# Patient Record
Sex: FEMALE | Race: WHITE | NOT HISPANIC OR LATINO | Employment: FULL TIME | URBAN - METROPOLITAN AREA
[De-identification: names, ages, dates, MRNs, and addresses within clinical notes are randomized per-mention and may not be internally consistent; named-entity substitution may affect disease eponyms.]

---

## 2023-09-25 ENCOUNTER — HOSPITAL ENCOUNTER (OUTPATIENT)
Dept: DATA CONVERSION | Facility: HOSPITAL | Age: 58
End: 2023-09-25
Attending: ORTHOPAEDIC SURGERY | Admitting: ORTHOPAEDIC SURGERY

## 2023-09-25 DIAGNOSIS — M25.762 OSTEOPHYTE, LEFT KNEE: ICD-10-CM

## 2023-09-25 DIAGNOSIS — M21.162 VARUS DEFORMITY, NOT ELSEWHERE CLASSIFIED, LEFT KNEE: ICD-10-CM

## 2023-09-25 DIAGNOSIS — M17.12 UNILATERAL PRIMARY OSTEOARTHRITIS, LEFT KNEE: ICD-10-CM

## 2023-09-30 NOTE — H&P
History & Physical Reviewed:   Pregnant/Lactating:  ·  Are You Pregnant no   ·  Are You Currently Breastfeeding no     I have reviewed the History and Physical dated:  25-Sep-2023   History and Physical reviewed and relevant findings noted. Patient examined to review pertinent physical  findings.: No significant changes   Home Medications Reviewed: no changes noted   Allergies Reviewed: no changes noted       ERAS (Enhanced Recovery After Surgery):  ·  ERAS Patient: yes   ·  CPM/PAT Utilization: yes   ·  Immunonutrition Recovery Drink Utilization: yes   ·  Carbohydrate Supplement Drink Utilization: yes     Consent:   COVID-19 Consent:  ·  COVID-19 Risk Consent Surgeon has reviewed key risks related to the risk of june COVID-19 and if they contract COVID-19 what the risks are.       Electronic Signatures:  Chris Franklin)  (Signed 25-Sep-2023 07:35)   Authored: History & Physical Reviewed, ERAS, Consent,  Note Completion      Last Updated: 25-Sep-2023 07:35 by Chris Franklin)

## 2023-10-01 NOTE — OP NOTE
Post Operative Note:     PreOp Diagnosis: osteoarthritis left knee   Post-Procedure Diagnosis: osteoarthritis left knee   Procedure: Left total knee replacement   Surgeon: Chris Franklin D.O.   Resident/Fellow/Other Assistant: Colin Nuñez S.A.   Anesthesia: spinal , adductor canal block   Estimated Blood Loss (mL): 50 ml   Specimen: no   Findings: osteoarthritis left knee       Electronic Signatures:  Chris Franklin)  (Signed 25-Sep-2023 10:21)   Authored: Post Operative Note, Note Completion      Last Updated: 25-Sep-2023 10:21 by Chris Franklin)

## 2024-05-06 NOTE — OP NOTE
PREOPERATIVE DIAGNOSIS:  Osteoarthritis, left knee with varus deformity.    POSTOPERATIVE DIAGNOSIS:  Osteoarthritis, left knee with varus deformity.    OPERATION/PROCEDURE:  Right total knee replacement (DePuy, attune, rotating platform, MIS  approach).     SURGEON:  Chris Franklin DO.    ASSISTANT(S):  Colin Nuñez SA    ANESTHESIA:  Spinal with adductor canal block.    PATHOLOGY:  This 57-year-old female complained of pain and deformity in the left  knee ongoing for a number of years.  She had failed conservative  measures, had positive provocative test and radiographs revealed  advanced arthritis in the left knee.  She elected to proceed with  total knee replacement.  At the time of surgery, severe  osteoarthritis was noted in the left knee with bone-on-bone changes  present in the medial and patellofemoral joints and large osteophytes  also noted.     DESCRIPTION OF PROCEDURE:  The patient brought to operative suite.  Satisfactory spinal and  adductor canal block anesthetic was administered by the department of  anesthesia.  The left lower extremity was sterilely draped and  prepped free in routine surgical fashion.  A tourniquet was applied  in the upper 1/3rd of the patient's left thigh, and the left lower  extremity was exsanguinated free of blood.  Tourniquet inflated to  325 mmHg.  Landmarks were identified.  Incision was performed over  the anteromedial aspect of the patient's left knee extending  approximately 12 cm in length.  Dissection carried down to the  extensor mechanism, where a trivector quad sparing incision was  performed.  Patella was everted 90 degrees, fat pad was excised.  Patella was measured and found to be 24 mm thick.  The arthritic  articular cartilage was resected from the patella using a sagittal  saw.  Drill holes were placed in the cut surface of the patella, and  a trial size 32 patella was placed.  The height of the patella was  measured with the trial in place  and found to be 24 mm thick.  A  protective plate was placed on the cut surface of the patella, and  the knee was flexed.  Osteophytes removed with rongeur.  ACL and PCL  were sacrificed along with the medial lateral meniscus.  A  hole  was created in the distal femur for intramedullary instrumentation.  Distal femoral cut was performed using a sagittal saw.  Femur was  sized and found to be a size 5 narrow.  A size 5 cutting block was  pinned to the distal femur, and appropriate cuts were carried out  using a sagittal saw.  A reciprocating saw was used to create the  notch cut for the posterior stabilized implant.  Attention was then  directed to the tibia, where a  hole was created in the proximal  tibia for intramedullary instrumentation.  Cutting block assembly was  applied over the guide yamila, and approximately 1 cm of proximal tibia  was resected using a sagittal saw.  Tibia was sized, and found to be  a size 5.  Posterior osteophytes removed from the femur at this time  using a curved osteotome.  Trial implants were inserted using a size  5 narrow femur, size 5 tibia, 12 mm polyethylene, and a 32 patella.  There was found to be full extension, flexion beyond 120 degrees,  good collateral stability, and satisfactory tracking of the  patellofemoral joint.  Trial components were removed.  Proximal tibia  was finished using a drill and broach.  Bony ends were lavaged and  dried.  Bone cement was mixed.  A size 5 rotating platform tibial  base plate was cemented into position.  A size 5 narrow posterior  stabilized femoral component was cemented into position.  A size 32  all-polyethylene patella was cemented into position.  Excess bone  cement was removed.  A size 12 mm rotating platform polyethylene was  placed in the tibial base plate.  The knee was reduced, taken through  a full range of motion and found to have excellent motion and  stability.  Copious irrigation was performed.  Tourniquet  released.  Good blood flow was noted to return to the patient's left lower  extremity.  Hemostasis was obtained by the use of electrocautery and  Aquamantys anticoagulation device.  Fascia was then closed with a  running locked #1 undyed Vicryl, a #2 FiberWire suture and a barbed  #2 suture.  Watertight closure was accomplished.  Subcutaneous  tissues were approximated using interrupted 2-0 undyed Vicryl and  subcuticular skin closure was performed using a 3-0 V-Loc followed by  Dermabond, Steri-Strips, and a Mepilex silver dressing.  XOCHITL hose and  Ace bandage were applied.  The patient was transferred to Recovery  having tolerated procedure well.       Chris Franklin DO    DD:  09/25/2023 15:07:44 EST  DT:  09/25/2023 23:40:32 EST  DICTATION NUMBER:  [id number]  INTERNAL JOB NUMBER:  [id number]    CC:  Chris Franklin DO, Fax: [phone]        Electronic Signatures:  Chris Franklin) (Signed on 26-Sep-2023 07:04)   Authored  Unsigned, Draft (SYS GENERATED) (Entered on 25-Sep-2023 23:40)   Entered    Last Updated: 26-Sep-2023 07:04 by Chris Franklin)

## 2024-07-11 ENCOUNTER — OFFICE VISIT (OUTPATIENT)
Dept: CARDIOLOGY | Facility: HOSPITAL | Age: 59
End: 2024-07-11
Payer: COMMERCIAL

## 2024-07-11 VITALS
HEART RATE: 73 BPM | SYSTOLIC BLOOD PRESSURE: 135 MMHG | DIASTOLIC BLOOD PRESSURE: 85 MMHG | WEIGHT: 179.23 LBS | BODY MASS INDEX: 30.77 KG/M2 | OXYGEN SATURATION: 97 %

## 2024-07-11 DIAGNOSIS — I10 ESSENTIAL HYPERTENSION: Primary | ICD-10-CM

## 2024-07-11 DIAGNOSIS — R00.2 PALPITATIONS: ICD-10-CM

## 2024-07-11 LAB
ATRIAL RATE: 70 BPM
P AXIS: 67 DEGREES
P OFFSET: 214 MS
P ONSET: 160 MS
PR INTERVAL: 128 MS
Q ONSET: 224 MS
QRS COUNT: 12 BEATS
QRS DURATION: 74 MS
QT INTERVAL: 418 MS
QTC CALCULATION(BAZETT): 451 MS
QTC FREDERICIA: 440 MS
R AXIS: 58 DEGREES
T AXIS: 52 DEGREES
T OFFSET: 433 MS
VENTRICULAR RATE: 70 BPM

## 2024-07-11 PROCEDURE — 93005 ELECTROCARDIOGRAM TRACING: CPT | Performed by: NURSE PRACTITIONER

## 2024-07-11 PROCEDURE — 99214 OFFICE O/P EST MOD 30 MIN: CPT | Performed by: NURSE PRACTITIONER

## 2024-07-11 PROCEDURE — 3079F DIAST BP 80-89 MM HG: CPT | Performed by: NURSE PRACTITIONER

## 2024-07-11 PROCEDURE — 3075F SYST BP GE 130 - 139MM HG: CPT | Performed by: NURSE PRACTITIONER

## 2024-07-11 RX ORDER — LOSARTAN POTASSIUM 25 MG/1
25 TABLET ORAL DAILY
COMMUNITY
End: 2024-07-11 | Stop reason: WASHOUT

## 2024-07-11 RX ORDER — BUPROPION HYDROCHLORIDE 150 MG/1
150 TABLET ORAL DAILY
COMMUNITY

## 2024-07-11 RX ORDER — POTASSIUM CHLORIDE 750 MG/1
10 TABLET, FILM COATED, EXTENDED RELEASE ORAL DAILY
COMMUNITY
End: 2024-07-11 | Stop reason: ALTCHOICE

## 2024-07-11 RX ORDER — VENLAFAXINE 75 MG/1
75 TABLET ORAL 2 TIMES DAILY
COMMUNITY

## 2024-07-11 RX ORDER — METOPROLOL SUCCINATE 25 MG/1
25 TABLET, EXTENDED RELEASE ORAL DAILY
COMMUNITY
End: 2024-07-11 | Stop reason: ALTCHOICE

## 2024-07-11 RX ORDER — HYDROCHLOROTHIAZIDE 12.5 MG/1
12.5 TABLET ORAL DAILY
Qty: 90 TABLET | Refills: 3 | Status: SHIPPED | OUTPATIENT
Start: 2024-07-11 | End: 2025-07-11

## 2024-07-11 RX ORDER — LOSARTAN POTASSIUM 25 MG/1
25 TABLET ORAL DAILY
Qty: 90 TABLET | Refills: 3 | Status: SHIPPED | OUTPATIENT
Start: 2024-07-11 | End: 2025-07-11

## 2024-07-11 RX ORDER — HYDROCHLOROTHIAZIDE 12.5 MG/1
12.5 TABLET ORAL DAILY
COMMUNITY
End: 2024-07-11 | Stop reason: WASHOUT

## 2024-07-12 NOTE — PROGRESS NOTES
Chief Complaint:   Annual follow up      History Of Present Illness:    Tanya Addison is a 58 y.o. female with h/o stress cardiomyopathy with improved EF (35-40% 5/2023-->50-55% 7/2023), htn, migraine, depression presenting today for follow up. Has been feeling well from a cardiac standpoint.  Denies symptoms of chest pain, SOB, palpitations.  She ran out of her medications about 1 month ago.  BP running 130//80's at home.      Last Recorded Vitals:  Vitals:    07/11/24 1538   BP: 135/85   Pulse: 73   SpO2: 97%   Weight: 81.3 kg (179 lb 3.7 oz)       Past Medical History:  She has no past medical history on file.    Past Surgical History:  She has a past surgical history that includes Appendectomy (07/14/2016); Other surgical history (07/14/2016); Knee surgery (07/14/2016); and Kidney surgery (07/14/2016).      Social History:  She reports that she has never smoked. She has never used smokeless tobacco. She reports that she does not currently use alcohol. She reports that she does not currently use drugs.    Family History:  No family history on file.     Allergies:  Sulfa (sulfonamide antibiotics)    Outpatient Medications:  Current Outpatient Medications   Medication Instructions    buPROPion XL (WELLBUTRIN XL) 150 mg, oral, Daily, Do not crush, chew, or split.    ferrous fumarate-vitamin C (Mikey-Sequeles 65-25) 1 tablet, oral, 3 times daily (morning, midday, late afternoon), Do not crush, chew, or split.    hydroCHLOROthiazide (MICROZIDE) 12.5 mg, oral, Daily    losartan (COZAAR) 25 mg, oral, Daily    venlafaxine (EFFEXOR) 75 mg, oral, 2 times daily       Physical Exam:  Constitutional: Pleasant, Awake/Alert/Oriented to person place and time. No distress  Head: Atraumatic, Normocephalic  Eyes: EOMI. ZAYNAB  Neck:No JVD  Cardiovascular: Regular rate and rhythm, S1, S2. No extra heart sounds or murmurs  Respiratory: Clear to auscultation bilaterally. No wheezing, rales or rhonchi. Good chest wall  "expansion  Abdomen: Soft, Nontender. Bowel sounds appreciated  Musculoskeletal: ROM intact. Muscle strength grossly intact upper and lower extremities .   Neurological: CNII-XII intact. Sensation grossly intact  Extremities: Warm and dry. No acute rashes and lesions  Psychiatric: Appropriate mood and affect       Last Labs:  CBC -  Lab Results   Component Value Date    WBC 8.5 2023    HGB 12.4 2023    HCT 38.3 2023    MCV 93 2023     2023       CMP -  Lab Results   Component Value Date    CALCIUM 9.0 2023    PHOS 3.0 2022    PROT 6.5 05/15/2023    ALBUMIN 3.8 05/15/2023    ALBUMIN 4.3 2022    AST 31 05/15/2023    ALT 17 05/15/2023    ALKPHOS 46 05/15/2023    BILITOT 0.5 05/15/2023       LIPID PANEL -   Lab Results   Component Value Date    CHOL 190 2023    TRIG 119 2023    HDL 68.9 2023    CHHDL 2.8 2023    CHHDL 3.2 2021    LDLF 97 2023    VLDL 24 2023       RENAL FUNCTION PANEL -   Lab Results   Component Value Date    GLUCOSE 90 2023     2023    K 3.7 2023     2023    CO2 29 2023    ANIONGAP 13 2023    BUN 12 2023    CREATININE 0.92 2023    CALCIUM 9.0 2023    PHOS 3.0 2022    ALBUMIN 3.8 05/15/2023    ALBUMIN 4.3 2022        No results found for: \"BNP\", \"HGBA1C\"    Last Cardiology Tests:  EC2024-- NSR, HR 70bpm, no axis deviation, Qtc 451msec, narrow QRS     Echo:  2023 echo:  CONCLUSIONS:  1. Left ventricular systolic function is low normal with a 50-55% estimated ejection fraction.  2. There is trace-mild mitral, tricuspid and pulmonic regurgitation.    2023 echo  CONCLUSIONS:  1. Left ventricular systolic function is mildly to moderately decreased with a 35-40% estimated ejection fraction.  2. Apical septal segment and apex are abnormal.  3. Spectral Doppler shows an impaired relaxation pattern of left ventricular " diastolic filling.  4. There is mild mitral and tricuspid regurgitation.    Cath:  5/16/2023 [facility]  CONCLUSIONS:  1. Non-ST elevation myocardial infarction and moderate left ventricular systolic dysfunction.  2. No significant disease noted in his right dominant, coronary system.  3. GDMT for HF.      Lab review: I have personally reviewed the laboratory result(s)   Diagnostic review: I have personally reviewed the result(s) of the Echocardiogram and [facility]     Assessment/Plan   Very pleasant 58 y.o. female with h/o stress cardiomyopathy with improved EF (35-40% 5/2023-->50-55% 7/2023), htn, migraine, depression presenting today for follow up. Doing well from a cardiac standpoint.      Plan:  -Restart losartan 25mg daily and hydrochlorothiazide 12.5mg daily  -Hold off on resuming metoprolol as EF improved to low normal as well as to avoid fatigue side effect  -Follow up in 1 year or sooner if needed       DARLINE Stacy-CNP

## 2024-08-16 ENCOUNTER — APPOINTMENT (OUTPATIENT)
Dept: RADIOLOGY | Facility: HOSPITAL | Age: 59
End: 2024-08-16
Payer: COMMERCIAL

## 2024-08-16 VITALS — WEIGHT: 179 LBS | BODY MASS INDEX: 30.56 KG/M2 | HEIGHT: 64 IN

## 2024-08-16 DIAGNOSIS — Z12.31 SCREENING MAMMOGRAM FOR BREAST CANCER: ICD-10-CM

## 2024-08-16 PROCEDURE — 77067 SCR MAMMO BI INCL CAD: CPT

## 2024-08-16 PROCEDURE — 77067 SCR MAMMO BI INCL CAD: CPT | Performed by: RADIOLOGY

## 2024-08-16 PROCEDURE — 77063 BREAST TOMOSYNTHESIS BI: CPT | Performed by: RADIOLOGY

## 2024-09-18 ENCOUNTER — TELEPHONE (OUTPATIENT)
Dept: PRIMARY CARE | Facility: CLINIC | Age: 59
End: 2024-09-18
Payer: COMMERCIAL

## 2024-10-22 ENCOUNTER — APPOINTMENT (OUTPATIENT)
Dept: PRIMARY CARE | Facility: CLINIC | Age: 59
End: 2024-10-22
Payer: COMMERCIAL

## 2025-07-10 ENCOUNTER — OFFICE VISIT (OUTPATIENT)
Dept: CARDIOLOGY | Facility: HOSPITAL | Age: 60
End: 2025-07-10
Payer: COMMERCIAL

## 2025-07-10 VITALS
DIASTOLIC BLOOD PRESSURE: 67 MMHG | BODY MASS INDEX: 31.18 KG/M2 | OXYGEN SATURATION: 98 % | HEART RATE: 67 BPM | WEIGHT: 181.66 LBS | SYSTOLIC BLOOD PRESSURE: 131 MMHG

## 2025-07-10 DIAGNOSIS — R00.2 PALPITATIONS: ICD-10-CM

## 2025-07-10 DIAGNOSIS — Z00.00 WELLNESS EXAMINATION: Primary | ICD-10-CM

## 2025-07-10 LAB
ATRIAL RATE: 62 BPM
P AXIS: 47 DEGREES
P OFFSET: 207 MS
P ONSET: 158 MS
PR INTERVAL: 126 MS
Q ONSET: 221 MS
QRS COUNT: 10 BEATS
QRS DURATION: 78 MS
QT INTERVAL: 434 MS
QTC CALCULATION(BAZETT): 440 MS
QTC FREDERICIA: 439 MS
R AXIS: 50 DEGREES
T AXIS: 26 DEGREES
T OFFSET: 438 MS
VENTRICULAR RATE: 62 BPM

## 2025-07-10 PROCEDURE — 93005 ELECTROCARDIOGRAM TRACING: CPT | Performed by: NURSE PRACTITIONER

## 2025-07-10 PROCEDURE — 99214 OFFICE O/P EST MOD 30 MIN: CPT | Performed by: NURSE PRACTITIONER

## 2025-07-10 PROCEDURE — 99212 OFFICE O/P EST SF 10 MIN: CPT

## 2025-07-11 NOTE — PROGRESS NOTES
Chief Complaint:   Follow up      History Of Present Illness:    Tanya Addison is a 59 y.o. female with h/o stress cardiomyopathy 5/2023 (EF 35-40% 5/2023) with improved EF to 50-55% 7/2025, migraines, depression, and hypertension.  She states she ran out of all of her prescription medications about 4 months ago.  BP at home and when she donates plasma has remained well controlled 120/70-13/80's.  Denies chest pain, SOB, or palpitations.  Overall feeling well.      Last Recorded Vitals:  Vitals:    07/10/25 1528   BP: 131/67   Pulse: 67   SpO2: 98%   Weight: 82.4 kg (181 lb 10.5 oz)       Past Medical History:  She has a past medical history of Hypertension (2020) and Myocardial infarction (Multi) (5/15/2023).    Past Surgical History:  She has a past surgical history that includes Appendectomy (07/14/2016); Other surgical history (07/14/2016); Knee surgery (07/14/2016); and Kidney surgery (07/14/2016).      Social History:  She reports that she has quit smoking. Her smoking use included cigarettes. She has a 2.5 pack-year smoking history. She has never used smokeless tobacco. She reports that she does not currently use alcohol. She reports that she does not currently use drugs.    Family History:  Family History[1]     Allergies:  Sulfa (sulfonamide antibiotics)    Outpatient Medications:  Current Outpatient Medications   Medication Instructions    buPROPion XL (WELLBUTRIN XL) 150 mg, Daily    ferrous fumarate-vitamin C (Mikey-Sequeles 65-25) 1 tablet, 3 times daily (morning, midday, late afternoon)    hydroCHLOROthiazide (MICROZIDE) 12.5 mg, oral, Daily    losartan (COZAAR) 25 mg, oral, Daily    venlafaxine (EFFEXOR) 75 mg, 2 times daily       Physical Exam:  Constitutional: Pleasant, Awake/Alert/Oriented to person place and time.   Head: Atraumatic, Normocephalic  Eyes: EOMI. ZAYNAB  Neck: Enlarged neck circumference, No JVD  Cardiovascular: Regular rate and rhythm, S1, S2. No extra heart sounds or  "murmurs  Respiratory: Clear to auscultation bilaterally. No wheezing, rales or rhonchi.   Abdomen: Soft, Nontender. Bowel sounds appreciated  Musculoskeletal: ROM intact. Muscle strength grossly intact upper and lower extremities 5/5.   Neurological: CNII-XII intact. Sensation grossly intact  Extremities: Warm and dry. No acute rashes and lesions  Psychiatric: Appropriate mood and affect       Last Labs:  CBC -  Lab Results   Component Value Date    WBC 8.5 09/05/2023    HGB 12.4 09/05/2023    HCT 38.3 09/05/2023    MCV 93 09/05/2023     09/05/2023       CMP -  Lab Results   Component Value Date    CALCIUM 9.0 09/05/2023    PHOS 3.0 09/03/2022    PROT 6.5 05/15/2023    ALBUMIN 3.8 05/15/2023    ALBUMIN 4.3 09/02/2022    AST 31 05/15/2023    ALT 17 05/15/2023    ALKPHOS 46 05/15/2023    BILITOT 0.5 05/15/2023       LIPID PANEL -   Lab Results   Component Value Date    CHOL 190 05/16/2023    TRIG 119 05/16/2023    HDL 68.9 05/16/2023    CHHDL 2.8 05/16/2023    CHHDL 3.2 04/09/2021    LDLF 97 05/16/2023    VLDL 24 05/16/2023       RENAL FUNCTION PANEL -   Lab Results   Component Value Date    GLUCOSE 90 09/05/2023     09/05/2023    K 3.7 09/05/2023     09/05/2023    CO2 29 09/05/2023    ANIONGAP 13 09/05/2023    BUN 12 09/05/2023    CREATININE 0.92 09/05/2023    CALCIUM 9.0 09/05/2023    PHOS 3.0 09/03/2022    ALBUMIN 3.8 05/15/2023    ALBUMIN 4.3 09/02/2022        No results found for: \"BNP\", \"HGBA1C\"    Last Cardiology Tests:  ECG:  ECG today: NSR, HR 62bpm     Echo:  7//5/2023 echo:  CONCLUSIONS:  1. Left ventricular systolic function is low normal with a 50-55% estimated ejection fraction.  2. There is trace-mild mitral, tricuspid and pulmonic regurgitation.     5/16/2023 echo  CONCLUSIONS:  1. Left ventricular systolic function is mildly to moderately decreased with a 35-40% estimated ejection fraction.  2. Apical septal segment and apex are abnormal.  3. Spectral Doppler shows an impaired " relaxation pattern of left ventricular diastolic filling.  4. There is mild mitral and tricuspid regurgitation.     Cath:  5/16/2023 [facility]  CONCLUSIONS:  1. Non-ST elevation myocardial infarction and moderate left ventricular systolic dysfunction.  2. No significant disease noted in his right dominant, coronary system.  3. GDMT for HF.        Lab review: I have personally reviewed the laboratory result(s)   Diagnostic review: I have personally reviewed the result(s) of the Echocardiogram and [facility]     Assessment/Plan   Very pleasant 59 y.o. female with h/o stress cardiomyopathy 5/2023 (EF 35-40% 5/2023) with improved EF to 50-55% 7/2025, migraines, depression, and hypertension.     Plan:  BP remains well controlled despite no longer taking antihypertensives. Continue to monitor BP with home log and if running >140/90's consistently we will resume losartan/hydrochlorothiazide as taking prior.  Did not tolerate BB due to side effect of fatigue.  Will check fasting lipid panel.  Follow up in 1 year or sooner if needed       Nori Montanez, APRN-CNP       [1]   Family History  Problem Relation Name Age of Onset    Breast cancer Sister Maria Guadalupe Fernandez 50 - 59    Ovarian cancer Sister Macario Larson 40 - 49    Breast cancer Father's Sister      Breast cancer Father's Sister      Cancer Sister Maria Guadalupe Fernandez 40 - 49    Cancer Sister Macario Larson 40 - 49    Cancer Father's Sister      Heart attack Father Jean 50 - 59

## 2025-08-27 ENCOUNTER — APPOINTMENT (OUTPATIENT)
Dept: RADIOLOGY | Facility: HOSPITAL | Age: 60
End: 2025-08-27
Payer: COMMERCIAL

## 2025-08-27 ENCOUNTER — APPOINTMENT (OUTPATIENT)
Dept: CARDIOLOGY | Facility: HOSPITAL | Age: 60
End: 2025-08-27
Payer: COMMERCIAL

## 2025-08-27 ENCOUNTER — HOSPITAL ENCOUNTER (EMERGENCY)
Facility: HOSPITAL | Age: 60
Discharge: HOME | End: 2025-08-27
Attending: EMERGENCY MEDICINE
Payer: COMMERCIAL

## 2025-08-27 ASSESSMENT — LIFESTYLE VARIABLES
EVER FELT BAD OR GUILTY ABOUT YOUR DRINKING: NO
TOTAL SCORE: 0
HAVE YOU EVER FELT YOU SHOULD CUT DOWN ON YOUR DRINKING: NO
EVER HAD A DRINK FIRST THING IN THE MORNING TO STEADY YOUR NERVES TO GET RID OF A HANGOVER: NO
HAVE PEOPLE ANNOYED YOU BY CRITICIZING YOUR DRINKING: NO

## 2025-08-27 ASSESSMENT — PAIN DESCRIPTION - PAIN TYPE: TYPE: ACUTE PAIN

## 2025-08-27 ASSESSMENT — PAIN SCALES - GENERAL
PAINLEVEL_OUTOF10: 5 - MODERATE PAIN
PAINLEVEL_OUTOF10: 6

## 2025-08-27 ASSESSMENT — PAIN DESCRIPTION - DESCRIPTORS: DESCRIPTORS: PRESSURE

## 2025-08-27 ASSESSMENT — PAIN DESCRIPTION - ORIENTATION: ORIENTATION: MID

## 2025-08-27 ASSESSMENT — PAIN DESCRIPTION - LOCATION: LOCATION: CHEST

## 2025-08-27 ASSESSMENT — PAIN - FUNCTIONAL ASSESSMENT: PAIN_FUNCTIONAL_ASSESSMENT: 0-10

## 2025-09-06 ASSESSMENT — HEART SCORE
RISK FACTORS: 1-2 RISK FACTORS
HEART SCORE: 2
TROPONIN: LESS THAN OR EQUAL TO NORMAL LIMIT
AGE: 45-64
HISTORY: SLIGHTLY SUSPICIOUS
ECG: NORMAL